# Patient Record
Sex: FEMALE | Race: BLACK OR AFRICAN AMERICAN | Employment: FULL TIME | URBAN - METROPOLITAN AREA
[De-identification: names, ages, dates, MRNs, and addresses within clinical notes are randomized per-mention and may not be internally consistent; named-entity substitution may affect disease eponyms.]

---

## 2021-09-20 ENCOUNTER — OFFICE VISIT (OUTPATIENT)
Dept: NEUROLOGY | Age: 36
End: 2021-09-20
Payer: COMMERCIAL

## 2021-09-20 VITALS
HEART RATE: 76 BPM | OXYGEN SATURATION: 99 % | WEIGHT: 183 LBS | DIASTOLIC BLOOD PRESSURE: 68 MMHG | TEMPERATURE: 98.2 F | SYSTOLIC BLOOD PRESSURE: 138 MMHG | BODY MASS INDEX: 29.41 KG/M2 | HEIGHT: 66 IN

## 2021-09-20 DIAGNOSIS — G50.0 TRIGEMINAL NEURALGIA OF RIGHT SIDE OF FACE: Primary | ICD-10-CM

## 2021-09-20 PROCEDURE — 99204 OFFICE O/P NEW MOD 45 MIN: CPT | Performed by: PSYCHIATRY & NEUROLOGY

## 2021-09-20 RX ORDER — GABAPENTIN 100 MG/1
100 CAPSULE ORAL
COMMUNITY
End: 2021-11-02

## 2021-09-20 NOTE — PROGRESS NOTES
Tiki Loera (1985) is a 43 y.o. female, new patient, here for evaluation of the following     Chief complaint(s):   Chief Complaint   Patient presents with    Other     Right sided jaw pain starting in February 2020, gradually getting worse with reoccuring episodes       HPI: 43 y.o. female      In Feb 2020, was having dental work done and recalls having an injection to back/ right of her mouth, that caused her significant pain. In Summer of 2020, had a few episodes of pain from right angle of jaw/ mandible, radiating down right jaw to middle of chin, feels it on right side of tongue, sometimes into ear or radiate to the right eye, not to the forehead, occasionally to the right cheek. Estimates the feeling can last for seconds up to a few minutes. Cannot identify any triggers for her facial pain. Cannot say that entering a cold temperature, any specific trigger zone on face, not triggered by chewing that she can tell. She thought it was wisdom tooth, had that removed in Oct-Sept 2020. In April 2021, started to have the same pain again, thought it was dental, saw Dentist, did some dental/ bite adjustment, used mouth guard when sleeping. Over the last 2 to 2.5 weeks, has been having the right jaw pain multiple times a day. Went to [facility], course of amoxicillin, steroid pack, and motrin. Symptoms persisted. Went back to [facility], and tried Indomethacin, didn't help. 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 Reviewed notes from [facility] dated 9-6-21, 9+-9-21, and 9-12-21, which mirror pt's report. Taking one gabapentin QHS and that helps sleep, helps with pain overnight. If any pain during daytime, she takes motrin.      She has follow up visit with her Dentist on 9-27-21    Review of Systems: completely negative     ==================================================    Not on File    Current Outpatient Medications   Medication Sig Dispense Refill    gabapentin (NEURONTIN) 100 mg capsule Take 100 mg by mouth nightly.  OXcarbazepine 150 mg Tb24 Take 1 Tablet by mouth nightly. For right sided facial pain (suspected trigeminal neuralgia) 30 Tablet 0       No past medical history on file. No past surgical history on file. family history is not on file. PHYSICAL EXAM    Vitals:    09/20/21 1255   BP: 138/68   BP 1 Location: Left arm   BP Patient Position: Sitting   BP Cuff Size: Adult   Pulse: 76   Temp: 98.2 °F (36.8 °C)   TempSrc: Temporal   Height: 5' 6\" (1.676 m)   Weight: 83 kg (183 lb)   SpO2: 99%       General:     Head: atraumatic. Eyes: Conjunctivae and cornea clear. Vascular/ Carotid Arteries: . Extremities: no edema. Skin: no rashes. Neurologic Exam:  Speech/ Language: normal.   Alertness: oriented x 3.  CNs: Smell: not tested. Visual Fields (II): full to confrontation. Pupils (II): equal, round, reactive to light. Funduscopic: normal bilateral.  Extraocular movements (III, IV, VI): conjugate in all directions, no TONY. Ptosis (III, VII): none. Facial Sensation (V): intact to LT, pinprick, and temp on both sides. Facial Movements (VII): symmetric at rest and on activation. Hearing (VIII): normal.  Soft palate elevation (IX): symmetric, no droop. Shoulder shrug (XI): symmetric, strong. Tongue protrusion (XII): midline    Motor:  5/5 in all exts. Sensory: intact LT, prick in all exts. Cerebellar: no resting, postural, or intention tremors . Deep Tendon Reflexes: 1+ biceps and patellars, symmetric. Plantar response: not tested. Gait: normal, including tandem. Romberg: not tested      ==================================================    ASSESSMENT/ PLAN:       ICD-10-CM ICD-9-CM    1.  Trigeminal neuralgia of right side of face  G50.0 350.1 MRI BRAIN W WO CONT      OXcarbazepine 150 mg Tb24        D/w patient that symptoms are consistent with trigeminal nerve pain/ paresthesia. She has upcoming follow up visit with her Dentist to re-evaluate. Recommended to her that if her Dentist cannot find any dental cause, that she discuss with them seeing an Endonontist (or Oral Surgeon) to ensure no underlying dental issue as the cause of her right jaw/ tongue pain. If nothing is found in that regard then diagnosis will be/ remain right sided trigeminal neuralgia. Will check MRI Brain +/- contrast to eval for any intracranial compression/ contact of right trigeminal nerve as the etiology. Discussed continuing Gabapentin vs trying Carbamazepine or Oxcarbazepine. Pt wanted to see if another medication would work for her but not cause her daytime tiredness. Rx'd Oxcarbazepine  mg tablet that she can take once a day (evening or morning) to see if that provides her better, full day coverage of her facial pain, and also may not have same/ drowsy side effects as gabapentin. Asked her if it was okay for me to send this note to her Dentist.  She said yes. Forwarding this note to Dr [facility]      Follow up in 6 weeks to reassess symptoms, review MRI result        An electronic signature was used to authenticate this note.   -- Adam Momin MD

## 2021-09-20 NOTE — PROGRESS NOTES
Chief Complaint   Patient presents with    Other     Right sided jaw pain starting in February 2020, gradually getting worse with reoccuring episodes     Visit Vitals  /68 (BP 1 Location: Left arm, BP Patient Position: Sitting, BP Cuff Size: Adult)   Pulse 76   Temp 98.2 °F (36.8 °C) (Temporal)   Ht 5' 6\" (1.676 m)   Wt 83 kg (183 lb)   SpO2 99%   BMI 29.54 kg/m²

## 2021-09-29 ENCOUNTER — HOSPITAL ENCOUNTER (OUTPATIENT)
Dept: MRI IMAGING | Age: 36
Discharge: HOME | End: 2021-09-29
Attending: PSYCHIATRY & NEUROLOGY
Payer: COMMERCIAL

## 2021-09-29 DIAGNOSIS — G50.0 TRIGEMINAL NEURALGIA OF RIGHT SIDE OF FACE: ICD-10-CM

## 2021-09-29 PROCEDURE — A9576 INJ PROHANCE MULTIPACK: HCPCS | Performed by: PSYCHIATRY & NEUROLOGY

## 2021-09-29 PROCEDURE — 70553 MRI BRAIN STEM W/O & W/DYE: CPT

## 2021-09-29 PROCEDURE — 74011250636 HC RX REV CODE- 250/636: Performed by: PSYCHIATRY & NEUROLOGY

## 2021-09-29 RX ADMIN — GADOTERIDOL 16 ML: 279.3 INJECTION, SOLUTION INTRAVENOUS at 11:34

## 2021-09-29 NOTE — PROGRESS NOTES
Let pt know Brain MRI was normal, nothing touching on the trigeminal nerve (as a cause of face pain).  We will forward the MRI report to patient's Dentist.

## 2021-11-02 ENCOUNTER — OFFICE VISIT (OUTPATIENT)
Dept: NEUROLOGY | Age: 36
End: 2021-11-02
Payer: COMMERCIAL

## 2021-11-02 VITALS
WEIGHT: 192 LBS | BODY MASS INDEX: 30.86 KG/M2 | RESPIRATION RATE: 16 BRPM | HEIGHT: 66 IN | SYSTOLIC BLOOD PRESSURE: 124 MMHG | HEART RATE: 88 BPM | OXYGEN SATURATION: 97 % | DIASTOLIC BLOOD PRESSURE: 74 MMHG

## 2021-11-02 DIAGNOSIS — G50.0 TRIGEMINAL NEURALGIA OF RIGHT SIDE OF FACE: Primary | ICD-10-CM

## 2021-11-02 PROCEDURE — 99214 OFFICE O/P EST MOD 30 MIN: CPT | Performed by: PSYCHIATRY & NEUROLOGY

## 2021-11-02 NOTE — PROGRESS NOTES
Chief Complaint   Patient presents with    Follow-up     trigeminal neuralgia, doing well on medication please refill oxcarbazepine       Visit Vitals  /74 (BP 1 Location: Left upper arm, BP Patient Position: Sitting)   Pulse 88   Resp 16   Ht 5' 6\" (1.676 m)   Wt 87.1 kg (192 lb)   SpO2 97%   BMI 30.99 kg/m²

## 2021-11-02 NOTE — PROGRESS NOTES
Hedy Smyth (1985) is a 43 y.o. female, established patient, here for evaluation of the following     Chief complaint(s):   Chief Complaint   Patient presents with    Follow-up     trigeminal neuralgia, doing well on medication please refill oxcarbazepine       SUBJECTIVE/ OBJECTIVE:    HPI: 43 y.o. female     Following up for right facial pain (see initial visit for full hx)  Brain MRI was normal; no pressure on either trigeminal nerve  At last/ initial visit, stopped Gabapentin as she reported it made her very tired  Started Oxcarbazepine  mg once a day  Pt says since being on this, no sleepiness/ fatigue and right facial pain has been controlled  She requests refill  Has followed up with dentist and says dentist thinks she may have TMJ pain       Review of Systems: as above    ========================================    Brief Hx:     Initial Visit: 9- (see for full details)      No Known Allergies      Current Outpatient Medications:     OXcarbazepine 150 mg Tb24, Take 1 Tablet by mouth nightly for 90 days. For right sided facial pain (suspected trigeminal neuralgia), Disp: 90 Tablet, Rfl: 1     has no past medical history on file. has no past surgical history on file. Physical Exam:    Vitals:    11/02/21 1338   BP: 124/74   BP 1 Location: Left upper arm   BP Patient Position: Sitting   Pulse: 88   Resp: 16   Height: 5' 6\" (1.676 m)   Weight: 87.1 kg (192 lb)   SpO2: 97%     Awake alert resting in chair. EOMI. Visual fields normal.  Face appears symmetric. Intact light touch V1 V2 V3 on both sides of face.    ========================================    ASSESSMENT/ PLAN:       ICD-10-CM ICD-9-CM    1.  Trigeminal neuralgia of right side of face  G50.0 350.1 OXcarbazepine 150 mg BV84      METABOLIC PANEL, COMPREHENSIVE      CBC WITH AUTOMATED DIFF      METABOLIC PANEL, COMPREHENSIVE      CBC WITH AUTOMATED DIFF      Renewed prescription for oxcarbazepine 150 mg 1 tablet daily, 90 days, +1 refill. Gave patient lab orders for CBC and CMP to check in 6 weeks (to make sure no hyponatremia or other electrolyte, hematologic abnormality). Follow-up in 6 months. An electronic signature was used to authenticate this note.   -- Juwan John MD

## 2021-12-22 LAB
ALBUMIN SERPL-MCNC: 4.2 G/DL (ref 3.8–4.8)
ALBUMIN/GLOB SERPL: 1.5 {RATIO} (ref 1.2–2.2)
ALP SERPL-CCNC: 58 IU/L (ref 44–121)
ALT SERPL-CCNC: 11 IU/L (ref 0–32)
AST SERPL-CCNC: 17 IU/L (ref 0–40)
BASOPHILS # BLD AUTO: 0.1 X10E3/UL (ref 0–0.2)
BASOPHILS NFR BLD AUTO: 1 %
BILIRUB SERPL-MCNC: 0.3 MG/DL (ref 0–1.2)
BUN SERPL-MCNC: 12 MG/DL (ref 6–20)
BUN/CREAT SERPL: 14 (ref 9–23)
CALCIUM SERPL-MCNC: 9.2 MG/DL (ref 8.7–10.2)
CHLORIDE SERPL-SCNC: 102 MMOL/L (ref 96–106)
CO2 SERPL-SCNC: 24 MMOL/L (ref 20–29)
CREAT SERPL-MCNC: 0.86 MG/DL (ref 0.57–1)
EOSINOPHIL # BLD AUTO: 0.3 X10E3/UL (ref 0–0.4)
EOSINOPHIL NFR BLD AUTO: 3 %
ERYTHROCYTE [DISTWIDTH] IN BLOOD BY AUTOMATED COUNT: 13 % (ref 11.7–15.4)
GLOBULIN SER CALC-MCNC: 2.8 G/DL (ref 1.5–4.5)
GLUCOSE SERPL-MCNC: 89 MG/DL (ref 65–99)
HCT VFR BLD AUTO: 41.3 % (ref 34–46.6)
HGB BLD-MCNC: 13.4 G/DL (ref 11.1–15.9)
IMM GRANULOCYTES # BLD AUTO: 0 X10E3/UL (ref 0–0.1)
IMM GRANULOCYTES NFR BLD AUTO: 0 %
LYMPHOCYTES # BLD AUTO: 2.2 X10E3/UL (ref 0.7–3.1)
LYMPHOCYTES NFR BLD AUTO: 20 %
MCH RBC QN AUTO: 28.3 PG (ref 26.6–33)
MCHC RBC AUTO-ENTMCNC: 32.4 G/DL (ref 31.5–35.7)
MCV RBC AUTO: 87 FL (ref 79–97)
MONOCYTES # BLD AUTO: 0.7 X10E3/UL (ref 0.1–0.9)
MONOCYTES NFR BLD AUTO: 6 %
NEUTROPHILS # BLD AUTO: 7.8 X10E3/UL (ref 1.4–7)
NEUTROPHILS NFR BLD AUTO: 70 %
PLATELET # BLD AUTO: 360 X10E3/UL (ref 150–450)
POTASSIUM SERPL-SCNC: 5 MMOL/L (ref 3.5–5.2)
PROT SERPL-MCNC: 7 G/DL (ref 6–8.5)
RBC # BLD AUTO: 4.74 X10E6/UL (ref 3.77–5.28)
SODIUM SERPL-SCNC: 138 MMOL/L (ref 134–144)
WBC # BLD AUTO: 11 X10E3/UL (ref 3.4–10.8)
